# Patient Record
Sex: FEMALE | Race: WHITE | NOT HISPANIC OR LATINO | URBAN - METROPOLITAN AREA
[De-identification: names, ages, dates, MRNs, and addresses within clinical notes are randomized per-mention and may not be internally consistent; named-entity substitution may affect disease eponyms.]

---
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
## 2024-04-18 NOTE — ED PROVIDER NOTE - CPE EDP EYES NORM
Called and spoke to patient. Patient informed this nurse that after she called our office she called [facility]. Was told that insurance covered a portion of Suprep but still has an OOP cost. Informed this nurse then to disregard form. Advised patient to try to use a Good Rx coupon to assist with OOP cost. Relayed understanding and will try to use Good Rx coupon. Advised patient to call office back if any additional questions.    normal...